# Patient Record
Sex: FEMALE | Race: WHITE | NOT HISPANIC OR LATINO | Employment: UNEMPLOYED | URBAN - METROPOLITAN AREA
[De-identification: names, ages, dates, MRNs, and addresses within clinical notes are randomized per-mention and may not be internally consistent; named-entity substitution may affect disease eponyms.]

---

## 2019-08-29 ENCOUNTER — TELEPHONE (OUTPATIENT)
Dept: PHARMACY | Facility: CLINIC | Age: 54
End: 2019-08-29

## 2019-08-29 NOTE — TELEPHONE ENCOUNTER
No answer/VM to inform patient that [facility] received prescription for Epclusa and benefits investigation is required.  [facility] will be back in touch once insurance determination is received.

## 2019-09-11 ENCOUNTER — TELEPHONE (OUTPATIENT)
Dept: PHARMACY | Facility: CLINIC | Age: 54
End: 2019-09-11

## 2019-09-11 NOTE — TELEPHONE ENCOUNTER
Patient will be dispensed authorized generic of Epclusa.  All references to Epclusa will be for the authorized generic.    Initial Epclusa consult completed on . Epclusa will be shipped on  to arrive at patient's home on  via [employer]. $0.00 copay. Patient will start Epclusa on . Address confirmed, CC on file. Confirmed 2 patient identifiers - name and . Therapy Appropriate.     Epclusa 400/100mg- Take one tablet by mouth daily x 12 weeks  Counseling was reviewed:   1. Patient MUST take Epclusa at the SAME time every day.   2. Patient MUST avoid acid reducers without consulting with myself or provider first. Antacids are to be spaced out at least 4 hours apart from Epclusa.   3. Potential Side effects include: nausea, headaches, insomnia and fatigue.   Headache: Patient may treat with OTC remedies. If Tylenol is used, dose should not exceed 2000mg per day.    4. Medication list reviewed. No DDIs or allergies noted. Patient MUST contact myself or provider prior to starting any new OTC, herbal, or prescription drugs to avoid potential DDIs.    DDI: Medication list reviewed and potential DDIs addressed.  Patient aware to hold Atorvastatin while on Epclusa.    Discussed the importance of staying well hydrated while on therapy. Compliance stressed - patient to take missed doses as soon as remembered, but NOT to take 2 doses in one day. Patient will report questions or concerns to myself or practitioner. Patient verbalizes understanding. Patient plans to start Epclusa on .  Consultation included: indication; goals of treatment; administration; storage and handling; side effects; how to handle side effects; the importance of compliance; how to handle missed doses; the importance of laboratory monitoring; the importance of keeping all follow up appointments.  Patient understands to report any medication changes to [facility] and provider. All questions answered and addressed to patients satisfaction. I will f/u  with her in 1 week from start, [facility] to contact patient in 3 weeks for refills.

## 2019-10-04 ENCOUNTER — TELEPHONE (OUTPATIENT)
Dept: PHARMACY | Facility: CLINIC | Age: 54
End: 2019-10-04

## 2019-10-04 NOTE — TELEPHONE ENCOUNTER
AG Epclusa refill (2 of 3) attempted. Spoke to patient's family member, who will give message to patient. Will f/u.

## 2019-10-07 NOTE — TELEPHONE ENCOUNTER
Epclusa (2 of 3)  refill confirmed. We will ship ag Epclusa refill on 10/8 via [employer] to arrive on 10/9. $0.00 copay- 004. Confirmed 2 patient identifiers - name and .     Patient has 5 doses of ag Epculsa remaining and takes it around 6:05pm daily.  Patient reports some joint pain she takes acetaminophen for, patient aware to limit Tylenol to 2000 mg daily. No missed doses, no new medications, no new allergies or health conditions reported at this time. All questions answered and addressed to patient's satisfaction. Patient verbalized understanding.

## 2019-10-29 ENCOUNTER — TELEPHONE (OUTPATIENT)
Dept: PHARMACY | Facility: CLINIC | Age: 54
End: 2019-10-29

## 2019-10-29 NOTE — TELEPHONE ENCOUNTER
Epclusa refill (3 of 3) confirmed. We will ship Epclusa refill on 10/31 via [employer] to arrive on . $0 copay- 004. Confirmed 2 patient identifiers - name and .     Patient has enough doses of Epclusa to last through the week and takes it around 6:00 pm daily.  Pt reports experiencing joint pain and dizziness. She takes tramadol for the joint pain. No missed doses, no new medications, no new allergies or health conditions reported at this time. All questions answered and addressed to patients satisfaction. Pt verbalized understanding.

## 2019-10-29 NOTE — TELEPHONE ENCOUNTER
Epclusa (3 of 3) refill attempted. Left message with patient's mother. She will have patient call us when she gets home.